# Patient Record
Sex: FEMALE | Race: WHITE | NOT HISPANIC OR LATINO | Employment: OTHER | URBAN - METROPOLITAN AREA
[De-identification: names, ages, dates, MRNs, and addresses within clinical notes are randomized per-mention and may not be internally consistent; named-entity substitution may affect disease eponyms.]

---

## 2019-06-04 ENCOUNTER — OFFICE VISIT (OUTPATIENT)
Dept: GASTROENTEROLOGY | Facility: CLINIC | Age: 64
End: 2019-06-04
Payer: MEDICARE

## 2019-06-04 ENCOUNTER — TELEPHONE (OUTPATIENT)
Dept: GASTROENTEROLOGY | Facility: CLINIC | Age: 64
End: 2019-06-04

## 2019-06-04 VITALS
DIASTOLIC BLOOD PRESSURE: 76 MMHG | HEART RATE: 81 BPM | HEIGHT: 62 IN | BODY MASS INDEX: 36.7 KG/M2 | SYSTOLIC BLOOD PRESSURE: 124 MMHG | WEIGHT: 199.4 LBS

## 2019-06-04 DIAGNOSIS — L03.311 CELLULITIS OF ABDOMINAL WALL: ICD-10-CM

## 2019-06-04 DIAGNOSIS — Z71.3 NUTRITIONAL COUNSELING: Primary | ICD-10-CM

## 2019-06-04 PROCEDURE — 99214 OFFICE O/P EST MOD 30 MIN: CPT | Performed by: PHYSICIAN ASSISTANT

## 2019-06-04 RX ORDER — TENOFOVIR DISOPROXIL FUMARATE 300 MG/1
300 TABLET, FILM COATED ORAL
COMMUNITY

## 2019-06-04 RX ORDER — RALTEGRAVIR 400 MG/1
TABLET, FILM COATED ORAL
COMMUNITY
Start: 2019-04-09

## 2019-06-04 RX ORDER — LACTOSE-REDUCED FOOD/FIBER 0.07 G-1.5
LIQUID (ML) ORAL
COMMUNITY
Start: 2015-10-21

## 2019-06-04 RX ORDER — ERYTHROMYCIN ETHYLSUCCINATE 200 MG/5ML
30 SUSPENSION ORAL
Qty: 474.6 ML | Refills: 0 | Status: SHIPPED | OUTPATIENT
Start: 2019-06-04 | End: 2019-06-12

## 2019-06-04 RX ORDER — LIDOCAINE 50 MG/G
OINTMENT TOPICAL
COMMUNITY
Start: 2019-01-10 | End: 2020-01-10

## 2019-06-04 RX ORDER — LAMIVUDINE 300 MG/1
TABLET, FILM COATED ORAL
COMMUNITY
Start: 2019-04-09

## 2019-06-12 ENCOUNTER — OFFICE VISIT (OUTPATIENT)
Dept: GASTROENTEROLOGY | Facility: CLINIC | Age: 64
End: 2019-06-12
Payer: MEDICARE

## 2019-06-12 ENCOUNTER — TELEPHONE (OUTPATIENT)
Dept: GASTROENTEROLOGY | Facility: CLINIC | Age: 64
End: 2019-06-12

## 2019-06-12 VITALS
DIASTOLIC BLOOD PRESSURE: 76 MMHG | BODY MASS INDEX: 36.63 KG/M2 | HEART RATE: 77 BPM | WEIGHT: 200.25 LBS | SYSTOLIC BLOOD PRESSURE: 138 MMHG

## 2019-06-12 DIAGNOSIS — R10.13 EPIGASTRIC PAIN: ICD-10-CM

## 2019-06-12 DIAGNOSIS — K21.9 GASTROESOPHAGEAL REFLUX DISEASE WITHOUT ESOPHAGITIS: Primary | ICD-10-CM

## 2019-06-12 PROCEDURE — 99214 OFFICE O/P EST MOD 30 MIN: CPT | Performed by: INTERNAL MEDICINE

## 2019-07-16 ENCOUNTER — TELEPHONE (OUTPATIENT)
Dept: GASTROENTEROLOGY | Facility: CLINIC | Age: 64
End: 2019-07-16

## 2019-07-29 RX ORDER — CLOTRIMAZOLE AND BETAMETHASONE DIPROPIONATE 10; .64 MG/G; MG/G
CREAM TOPICAL 2 TIMES DAILY
COMMUNITY
Start: 2019-07-23

## 2019-07-29 RX ORDER — NYSTATIN 100000 [USP'U]/G
POWDER TOPICAL
Refills: 0 | COMMUNITY
Start: 2019-07-09

## 2019-07-31 ENCOUNTER — OFFICE VISIT (OUTPATIENT)
Dept: GASTROENTEROLOGY | Facility: CLINIC | Age: 64
End: 2019-07-31
Payer: MEDICARE

## 2019-07-31 ENCOUNTER — TELEPHONE (OUTPATIENT)
Dept: GASTROENTEROLOGY | Facility: CLINIC | Age: 64
End: 2019-07-31

## 2019-07-31 VITALS
DIASTOLIC BLOOD PRESSURE: 72 MMHG | WEIGHT: 187.4 LBS | HEART RATE: 78 BPM | BODY MASS INDEX: 34.48 KG/M2 | HEIGHT: 62 IN | SYSTOLIC BLOOD PRESSURE: 122 MMHG

## 2019-07-31 DIAGNOSIS — L03.311 CELLULITIS OF ABDOMINAL WALL: Primary | ICD-10-CM

## 2019-07-31 DIAGNOSIS — L02.219 CELLULITIS AND ABSCESS OF TRUNK: Primary | ICD-10-CM

## 2019-07-31 DIAGNOSIS — L03.319 CELLULITIS AND ABSCESS OF TRUNK: Primary | ICD-10-CM

## 2019-07-31 PROCEDURE — 99213 OFFICE O/P EST LOW 20 MIN: CPT | Performed by: PHYSICIAN ASSISTANT

## 2019-07-31 RX ORDER — HYDROCODONE BITARTRATE AND ACETAMINOPHEN 5; 325 MG/1; MG/1
1 TABLET ORAL EVERY 6 HOURS PRN
Qty: 40 TABLET | Refills: 0 | Status: SHIPPED | OUTPATIENT
Start: 2019-07-31

## 2019-07-31 RX ORDER — LIDOCAINE 40 MG/G
CREAM TOPICAL AS NEEDED
Qty: 30 G | Refills: 0 | Status: SHIPPED | OUTPATIENT
Start: 2019-07-31

## 2019-07-31 RX ORDER — CLARITHROMYCIN 500 MG/1
500 TABLET, COATED ORAL EVERY 12 HOURS SCHEDULED
Qty: 14 TABLET | Refills: 0 | Status: SHIPPED | OUTPATIENT
Start: 2019-07-31 | End: 2019-08-02 | Stop reason: SDUPTHER

## 2019-07-31 NOTE — PROGRESS NOTES
[facility] - Outpatient Follow-up Note  Vasile Rivera 60 y o  female MRN: [id number]  Encounter: [id number]          ASSESSMENT AND PLAN:      1  Cellulitis and abscess of trunk  Her PEG tube was removed in the office by Dr Modesto Ni on June 12th  Her PEG site has continued to drain  She has severe erythema and irritation surrounding which she reports severely painful  Discussed with Dr Modesto Ni who recommends 1 week of antibiotics and follow up  If not improving will have patient evaluated by surgery    ______________________________________________________________________    SUBJECTIVE:  72-year-old female presents for follow-up after PEG removal   She has had a PEG in place for years as she has a permanent tracheostomy  She states that the PEG was removed on June 12th she has had continued drainage  She has severe irritation and pain as well as redness surrounding the PEG site  She claims that every time she eats or drinks anything there is liquid that comes out of the PEG site  She denies any fevers or chills  She tries to avoid wearing a bra as her sight is at the bra line  She states that she is eating and drinking and maintaining her weight  Chart documentation shows a 13 lb weight loss since June 12  REVIEW OF SYSTEMS IS OTHERWISE NEGATIVE        Historical Information   Past Medical History:   Diagnosis Date    Cancer ([facility] )     throat    HIV disease ([facility] )      Past Surgical History:   Procedure Laterality Date    FEEDING TUBE PLACEMENT       Social History   Social History     Substance and Sexual Activity   Alcohol Use Never    Frequency: Never     Social History     Substance and Sexual Activity   Drug Use Never     Social History     Tobacco Use   Smoking Status Never Smoker   Smokeless Tobacco Never Used     Family History   Problem Relation Age of Onset    Cancer Mother     Cancer Father        Meds/Allergies       Current Outpatient Medications:    clotrimazole-betamethasone (LOTRISONE) 1-0 05 % cream    diclofenac sodium (VOLTAREN) 1 %    HYDROcodone-acetaminophen (HYCET) 7 5-325 MG/15ML solution    ISENTRESS 400 MG tablet    lamiVUDine (EPIVIR) 300 MG tablet    lidocaine (XYLOCAINE) 5 % ointment    Nutritional Supplements (ISOSOURCE 1 5 HOWARD) LIQD    nystatin (MYCOSTATIN) powder    tenofovir (VIREAD) 300 mg tablet    Tenofovir Alafenamide Fumarate (VEMLIDY) 25 MG TABS    WATER FOR IRRIGATION, STERILE IR    erythromycin (PCE) 500 MG EC tablet    HYDROcodone-acetaminophen (NORCO) 5-325 mg per tablet    lidocaine (LMX) 4 % cream    Allergies   Allergen Reactions    Penicillins      swelling           Objective     Blood pressure 122/72, pulse 78, height 5' 2" (1 575 m), weight 85 kg (187 lb 6 4 oz)  Body mass index is 34 28 kg/m²  PHYSICAL EXAM:      General Appearance:   Alert, cooperative, no distress   HEENT:   Normocephalic, atraumatic, anicteric      Neck:  Supple, symmetrical, trachea midline   Lungs:   Clear to auscultation bilaterally; no rales, rhonchi or wheezing; respirations unlabored    Heart[de-identified]   Regular rate and rhythm; no murmur, rub, or gallop  Abdomen:   Soft, non-tender, non-distended; normal bowel sounds; no masses, no organomegaly    Genitalia:   Deferred    Rectal:   Deferred    Extremities:  No cyanosis, clubbing or edema    Pulses:  2+ and symmetric    Skin:  No jaundice, rashes, or lesions    Lymph nodes:  No palpable cervical lymphadenopathy        Lab Results:   No visits with results within 1 Day(s) from this visit  Latest known visit with results is:   No results found for any previous visit  Radiology Results:   No results found

## 2019-07-31 NOTE — TELEPHONE ENCOUNTER
[address] [facility] called  Erythromycin is not available - recommends Bioxin has alternative   Please call Carol Lu at [phone] ty

## 2019-08-02 ENCOUNTER — TELEPHONE (OUTPATIENT)
Dept: GASTROENTEROLOGY | Facility: CLINIC | Age: 64
End: 2019-08-02

## 2019-08-02 DIAGNOSIS — L02.219 CELLULITIS AND ABSCESS OF TRUNK: ICD-10-CM

## 2019-08-02 DIAGNOSIS — L03.311 CELLULITIS OF ABDOMINAL WALL: ICD-10-CM

## 2019-08-02 DIAGNOSIS — L03.319 CELLULITIS AND ABSCESS OF TRUNK: ICD-10-CM

## 2019-08-02 RX ORDER — CLARITHROMYCIN 500 MG/1
500 TABLET, COATED ORAL EVERY 12 HOURS SCHEDULED
Qty: 14 TABLET | Refills: 0 | Status: SHIPPED | OUTPATIENT
Start: 2019-08-02 | End: 2019-08-09

## 2019-08-02 NOTE — TELEPHONE ENCOUNTER
Jeannine Wolf - Patient called prescriptions from [address] [place] [address] Rd 07/31/19 need to be sent to [facility] at [phone] Fax [phone]   Please call patient when sent at [phone] ty

## 2019-08-09 ENCOUNTER — OFFICE VISIT (OUTPATIENT)
Dept: GASTROENTEROLOGY | Facility: CLINIC | Age: 64
End: 2019-08-09
Payer: MEDICARE

## 2019-08-09 DIAGNOSIS — L03.311 CELLULITIS OF ABDOMINAL WALL: Primary | ICD-10-CM

## 2019-08-09 PROCEDURE — 99213 OFFICE O/P EST LOW 20 MIN: CPT | Performed by: PHYSICIAN ASSISTANT

## 2019-08-09 NOTE — PROGRESS NOTES
[facility] - Outpatient Follow-up Note  Bailey Hill 60 y o  female MRN: [id number]  Encounter: [id number]          ASSESSMENT AND PLAN:      1  Cellulitis of abdominal wall  No improvement in erythema or drainage  She will see a surgeon at [address] [facility] today    ______________________________________________________________________    SUBJECTIVE:  60year old female presents for follow up of abdominal wall cellulitis due to a non healing PEG stoma site  The PEG was removed in June and is not closing  It continues to drain copious amounts of purulent liquid  She continues to report significant pain  She is eating less as she knows the drainage to will increase  REVIEW OF SYSTEMS IS OTHERWISE NEGATIVE        Historical Information   Past Medical History:   Diagnosis Date    Cancer ([place] [address] )     throat    HIV disease ([place] [address] )      Past Surgical History:   Procedure Laterality Date    FEEDING TUBE PLACEMENT       Social History   Social History     Substance and Sexual Activity   Alcohol Use Never    Frequency: Never     Social History     Substance and Sexual Activity   Drug Use Never     Social History     Tobacco Use   Smoking Status Never Smoker   Smokeless Tobacco Never Used     Family History   Problem Relation Age of Onset    Cancer Mother     Cancer Father        Meds/Allergies       Current Outpatient Medications:     clarithromycin (BIAXIN) 500 mg tablet    clotrimazole-betamethasone (LOTRISONE) 1-0 05 % cream    diclofenac sodium (VOLTAREN) 1 %    HYDROcodone-acetaminophen (HYCET) 7 5-325 MG/15ML solution    HYDROcodone-acetaminophen (NORCO) 5-325 mg per tablet    ISENTRESS 400 MG tablet    lamiVUDine (EPIVIR) 300 MG tablet    lidocaine (LMX) 4 % cream    lidocaine (XYLOCAINE) 5 % ointment    Nutritional Supplements (ISOSOURCE 1 5 HOWARD) LIQD    nystatin (MYCOSTATIN) powder    tenofovir (VIREAD) 300 mg tablet    Tenofovir Alafenamide Fumarate (VEMLIDY) 25 MG TABS   WATER FOR IRRIGATION, STERILE IR    Allergies   Allergen Reactions    Penicillins      swelling           Objective     There were no vitals taken for this visit  There is no height or weight on file to calculate BMI  PHYSICAL EXAM:      General Appearance:   Alert, cooperative, no distress   HEENT:   Normocephalic, atraumatic, anicteric      Neck:  Supple, symmetrical, trachea midline   Lungs:   Clear to auscultation bilaterally; no rales, rhonchi or wheezing; respirations unlabored    Heart[de-identified]   Regular rate and rhythm; no murmur, rub, or gallop  Abdomen:   Soft, non-tender, non-distended; normal bowel sounds; no masses, no organomegaly    Genitalia:   Deferred    Rectal:   Deferred    Extremities:  No cyanosis, clubbing or edema    Pulses:  2+ and symmetric    Skin:  No jaundice, rashes, or lesions    Lymph nodes:  No palpable cervical lymphadenopathy        Lab Results:   No visits with results within 1 Day(s) from this visit  Latest known visit with results is:   No results found for any previous visit  Radiology Results:   No results found